# Patient Record
Sex: FEMALE | Race: WHITE | Employment: STUDENT | URBAN - NONMETROPOLITAN AREA
[De-identification: names, ages, dates, MRNs, and addresses within clinical notes are randomized per-mention and may not be internally consistent; named-entity substitution may affect disease eponyms.]

---
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
## 2025-02-05 ENCOUNTER — HOSPITAL ENCOUNTER (EMERGENCY)
Age: 20
Discharge: HOME | End: 2025-02-05
Payer: COMMERCIAL

## 2025-02-05 VITALS
HEIGHT: 64 IN | HEART RATE: 77 BPM | BODY MASS INDEX: 23.9 KG/M2 | OXYGEN SATURATION: 100 % | TEMPERATURE: 97.1 F | SYSTOLIC BLOOD PRESSURE: 128 MMHG | WEIGHT: 140 LBS | DIASTOLIC BLOOD PRESSURE: 65 MMHG | RESPIRATION RATE: 14 BRPM

## 2025-02-05 DIAGNOSIS — M62.830 SPASM OF BACK MUSCLES: Primary | ICD-10-CM

## 2025-02-05 LAB
BILIRUB UR STRIP.AUTO-MCNC: NEGATIVE MG/DL
CHARACTER UR: CLEAR
COLOR, UA: YELLOW
GLUCOSE UR QL STRIP.AUTO: NEGATIVE MG/DL
HCG UR QL: NEGATIVE
KETONES UR QL STRIP.AUTO: NEGATIVE
NITRITE UR QL STRIP.AUTO: NEGATIVE
PH UR STRIP.AUTO: 7.5 [PH] (ref 5–9)
PROT UR STRIP.AUTO-MCNC: NEGATIVE MG/DL
RBC #/AREA URNS HPF: NEGATIVE /[HPF]
SP GR UR STRIP.AUTO: 1.02 (ref 1–1.03)
UROBILINOGEN, URINE: 0.2 EU/DL (ref 0.2–1)
WBC #/AREA URNS HPF: NEGATIVE /[HPF]

## 2025-02-05 PROCEDURE — 81003 URINALYSIS AUTO W/O SCOPE: CPT

## 2025-02-05 PROCEDURE — 99213 OFFICE O/P EST LOW 20 MIN: CPT

## 2025-02-05 PROCEDURE — 81025 URINE PREGNANCY TEST: CPT

## 2025-02-05 PROCEDURE — 99213 OFFICE O/P EST LOW 20 MIN: CPT | Performed by: NURSE PRACTITIONER

## 2025-02-05 RX ORDER — IBUPROFEN 400 MG/1
400 TABLET, FILM COATED ORAL EVERY 6 HOURS PRN
COMMUNITY

## 2025-02-05 RX ORDER — CYCLOBENZAPRINE HCL 10 MG
10 TABLET ORAL 3 TIMES DAILY PRN
Qty: 21 TABLET | Refills: 0 | Status: SHIPPED | OUTPATIENT
Start: 2025-02-05 | End: 2025-02-15

## 2025-02-05 ASSESSMENT — PAIN DESCRIPTION - FREQUENCY: FREQUENCY: INTERMITTENT

## 2025-02-05 ASSESSMENT — PAIN DESCRIPTION - LOCATION: LOCATION: BACK

## 2025-02-05 ASSESSMENT — PAIN DESCRIPTION - DESCRIPTORS: DESCRIPTORS: SPASM

## 2025-02-05 ASSESSMENT — PAIN - FUNCTIONAL ASSESSMENT: PAIN_FUNCTIONAL_ASSESSMENT: 0-10

## 2025-02-05 ASSESSMENT — ENCOUNTER SYMPTOMS
COUGH: 0
BACK PAIN: 1

## 2025-02-05 ASSESSMENT — PAIN SCALES - GENERAL: PAINLEVEL_OUTOF10: 8

## 2025-02-05 NOTE — ED PROVIDER NOTES
[facility] URGENT CARE  UrgentCare Encounter      CHIEFCOMPLAINT       Chief Complaint   Patient presents with    Spasms     Muscle spasms back  deneis injury / new activity       Nurses Notes reviewed and I agree except as noted in the HPI.  HISTORY OF PRESENT ILLNESS     Kendra Pritchard is a 19 y.o. female who presents to the urgent care for evaluation.  Back spasms since Friday on left lower back.   Denies injury or trauma.    Advil taken this morning for symptoms.   Has some abdominal cramping.     The patient/patient representative has no other acute complaints at this time.    REVIEW OF SYSTEMS     Review of Systems   Constitutional:  Negative for chills and fever.   Respiratory:  Negative for cough.    Cardiovascular:  Negative for chest pain.   Genitourinary:  Negative for dysuria, frequency and urgency.        \"Cloudy urine\"   Musculoskeletal:  Positive for back pain.       PAST MEDICAL HISTORY         Diagnosis Date    ADHD     Anxiety     Endometriosis        SURGICAL HISTORY     Patient  has a past surgical history that includes Abdomen surgery.    CURRENT MEDICATIONS       Discharge Medication List as of 2/5/2025  1:27 PM        CONTINUE these medications which have NOT CHANGED    Details   ibuprofen (ADVIL;MOTRIN) 400 MG tablet Take 1 tablet by mouth every 6 hours as needed for PainHistorical Med      DULoxetine (CYMBALTA) 60 MG extended release capsule Take 1 capsule by mouth dailyHistorical Med      amphetamine-dextroamphetamine (ADDERALL XR) 10 MG extended release capsule Take 2 capsules by mouth daily.Historical Med      levonorgestrel (LILETTA) 20.1 MCG/DAY IUD IUD 52 mg 1 each by IntraUTERine route once, IntraUTERine, ONCE Starting Thu 3/14/2024, Historical Med             ALLERGIES     Patient is has No Known Allergies.    FAMILY HISTORY     Patient'sfamily history is not on file.    SOCIAL HISTORY     Patient  reports that she has never smoked. She has never been exposed to tobacco smoke. She  Negative   Color, UA Yellow   Character, Urine Clear [HA]      ED Course User Index  [MARTIN] Cathy Padgett APRN - CNP       Problem List Items Addressed This Visit    None  Visit Diagnoses       Spasm of back muscles    -  Primary    Relevant Medications    cyclobenzaprine (FLEXERIL) 10 MG tablet               The results of pertinent diagnostic studies and exam findings were discussed with patient/patient representative.   Shared decision-making was performed and patient/patient representative are agreeable that they are suitable for discharge at this time.  The patient’s provisional diagnosis and plan of care were discussed with the patient/patient representative who expressed understanding.  The patient/representative is given strict written and verbal instructions about care at home, including over-the-counter management, prescription information, follow-up, and signs and symptoms of worsening of condition, and the patient/patient representative did verbalize understanding of these instructions.  Patient will go to nearest emergency department if symptoms change or worsen, or for any sign or symptom deemed emergent by the patient or family members. Follow up as an outpatient with PCP within the next 3 days, or sooner if symptoms warrant.       PATIENT REFERRED TO:  Janneth Mckeon MD  [address] [place] [address]  [phone]    Schedule an appointment as soon as possible for a visit in 3 days  as needed, For further evaluation., If symptoms change/worsen, go to the ER      LILLY Franks CNP    Please note that some or all of this chart was generated using [employer] voice recognition software. Although every effort was made to ensure the accuracy of this automated transcription, some errors in transcription may have occurred.         Cathy Padgett APRN - CNP  02/05/25 0389

## 2025-03-05 ENCOUNTER — OFFICE VISIT (OUTPATIENT)
Age: 20
End: 2025-03-05

## 2025-03-05 VITALS
WEIGHT: 152 LBS | TEMPERATURE: 98.1 F | OXYGEN SATURATION: 96 % | HEART RATE: 120 BPM | RESPIRATION RATE: 18 BRPM | SYSTOLIC BLOOD PRESSURE: 110 MMHG | BODY MASS INDEX: 26.09 KG/M2 | DIASTOLIC BLOOD PRESSURE: 78 MMHG

## 2025-03-05 DIAGNOSIS — J06.9 ACUTE UPPER RESPIRATORY INFECTION: ICD-10-CM

## 2025-03-05 DIAGNOSIS — R11.2 NAUSEA AND VOMITING, UNSPECIFIED VOMITING TYPE: ICD-10-CM

## 2025-03-05 DIAGNOSIS — R00.0 TACHYCARDIA: Primary | ICD-10-CM

## 2025-03-05 PROBLEM — F41.1 GENERALIZED ANXIETY DISORDER WITH PANIC ATTACKS: Status: RESOLVED | Noted: 2018-12-19 | Resolved: 2025-03-05

## 2025-03-05 PROBLEM — F41.0 GENERALIZED ANXIETY DISORDER WITH PANIC ATTACKS: Status: RESOLVED | Noted: 2018-12-19 | Resolved: 2025-03-05

## 2025-03-05 PROCEDURE — 87502 INFLUENZA DNA AMP PROBE: CPT | Performed by: NURSE PRACTITIONER

## 2025-03-05 PROCEDURE — 99213 OFFICE O/P EST LOW 20 MIN: CPT | Performed by: NURSE PRACTITIONER

## 2025-03-05 PROCEDURE — 87635 SARS-COV-2 COVID-19 AMP PRB: CPT | Performed by: NURSE PRACTITIONER

## 2025-03-05 RX ORDER — ONDANSETRON 4 MG/1
4 TABLET, ORALLY DISINTEGRATING ORAL EVERY 12 HOURS PRN
Qty: 5 TABLET | Refills: 0 | Status: SHIPPED | OUTPATIENT
Start: 2025-03-05

## 2025-03-05 SDOH — ECONOMIC STABILITY: FOOD INSECURITY: WITHIN THE PAST 12 MONTHS, YOU WORRIED THAT YOUR FOOD WOULD RUN OUT BEFORE YOU GOT MONEY TO BUY MORE.: NEVER TRUE

## 2025-03-05 SDOH — ECONOMIC STABILITY: FOOD INSECURITY: WITHIN THE PAST 12 MONTHS, THE FOOD YOU BOUGHT JUST DIDN'T LAST AND YOU DIDN'T HAVE MONEY TO GET MORE.: NEVER TRUE

## 2025-03-05 ASSESSMENT — ENCOUNTER SYMPTOMS
DIARRHEA: 0
SORE THROAT: 1
VOMITING: 1
ABDOMINAL PAIN: 0
SHORTNESS OF BREATH: 0
COUGH: 0
NAUSEA: 1
CHEST TIGHTNESS: 0
WHEEZING: 0

## 2025-03-05 ASSESSMENT — PATIENT HEALTH QUESTIONNAIRE - PHQ9
SUM OF ALL RESPONSES TO PHQ QUESTIONS 1-9: 0
2. FEELING DOWN, DEPRESSED OR HOPELESS: NOT AT ALL
1. LITTLE INTEREST OR PLEASURE IN DOING THINGS: NOT AT ALL

## 2025-03-05 NOTE — PATIENT INSTRUCTIONS
Monitor HR at home and if remains >120 beats per minute, seek higher level of care for further evaluation.  Go to the ER immediately if you develop chest pain, heart palpitations, lightheadedness/dizziness, or trouble breathing.    Clear liquid diet and advance as tolerated.  Follow-up if no improvement in symptoms in 24-48 hours.  Go to ER if you develop blood in your stool or vomit, symptoms don't improve within 24-48 hours, symptoms worsen, or you're unable to keep water down.  Monitor for symptoms of dehydration including weakness, dizziness, dark-colored urine, confusion, heart palpitations- if you experience any of these, go to an Urgent Care or ER.

## 2025-03-05 NOTE — PROGRESS NOTES
[facility] [place] [facility]  [facility]  [address] [facility] [address]  Dept: [phone]  Loc: [phone]     Kendra Pritchard is a 19 y.o. female who presents today for:  Chief Complaint   Patient presents with    Fever     Fever, sore throat (burns), body aches, stuffy, symptoms started Monday, became worse last night.       Assessment/Plan:     1. Acute upper respiratory infection  -covid and flu neg.  Respirations are unlabored.  Lungs CTA.  Declines strep test at this time.  -Follow up if no improvement after 7-10 days.  Over the counter medications such as Mucinex, Flonase, Tylenol, ibuprofen, Robitussin, and nasal saline will improve your symptoms.     2. Tachycardia  -pt denies chest pain, heart palpitations, lightheadedness, dizziness, and trouble breathing.  She did not take her stimulant medication this morning.  Instructed pt not to take stimulant medication today.  Monitor HR at home and if remains >120 bpm, seek higher level of care for further evaluation.  She verbalized understanding.    3. Nausea and vomiting, unspecified vomiting type  -pt vomited while in clinic.  Ondansetron rx.    -Clear liquid diet and advance as tolerated.  Follow-up if no improvement in symptoms in 24-48 hours.  Go to ER if you develop blood in your stool or vomit, symptoms don't improve within 24-48 hours, symptoms worsen, or you're unable to keep water down.  Monitor for symptoms of dehydration including weakness, dizziness, dark-colored urine, confusion, heart palpitations- if you experience any of these, go to an Urgent Care or ER.        Results for orders placed or performed in visit on 03/05/25   POCT Influenza A/B DNA (Alere i)   Result Value Ref Range    Influenza virus A RNA Negative     Influenza virus B RNA Negative    POCT COVID-19 Rapid, NAAT   Result Value Ref Range    SARS-COV-2, RdRp gene Negative Negative    Lot Number [id number]     QC Pass/Fail Pass

## 2025-04-03 ENCOUNTER — CLINICAL SUPPORT (OUTPATIENT)
Age: 20
End: 2025-04-03

## 2025-04-03 DIAGNOSIS — Z11.8 SCREENING FOR HEAD LICE: Primary | ICD-10-CM

## 2025-04-03 NOTE — PROGRESS NOTES
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 Head check complete, small amount of lice eggs observed above right ear and back of head, no active lice observed. Advised to treat hair with head lice removal shampoo today, voiced understanding.